# Patient Record
Sex: MALE | Race: WHITE
[De-identification: names, ages, dates, MRNs, and addresses within clinical notes are randomized per-mention and may not be internally consistent; named-entity substitution may affect disease eponyms.]

---

## 2017-03-18 ENCOUNTER — HOSPITAL ENCOUNTER (OUTPATIENT)
Dept: HOSPITAL 45 - C.LAB | Age: 27
Discharge: HOME | End: 2017-03-18
Attending: PSYCHIATRY & NEUROLOGY
Payer: COMMERCIAL

## 2017-03-18 DIAGNOSIS — F90.0: Primary | ICD-10-CM

## 2017-03-18 DIAGNOSIS — R19.7: ICD-10-CM

## 2017-03-18 DIAGNOSIS — F42.9: ICD-10-CM

## 2017-03-18 LAB
ALBUMIN/GLOB SERPL: 1.2 {RATIO} (ref 0.9–2)
ALP SERPL-CCNC: 72 U/L (ref 45–117)
ALT SERPL-CCNC: 30 U/L (ref 12–78)
ANION GAP SERPL CALC-SCNC: 8 MMOL/L (ref 3–11)
AST SERPL-CCNC: 17 U/L (ref 15–37)
BASOPHILS # BLD: 0.09 K/UL (ref 0–0.2)
BASOPHILS NFR BLD: 1.2 %
BUN SERPL-MCNC: 12 MG/DL (ref 7–18)
BUN/CREAT SERPL: 14.4 (ref 10–20)
CALCIUM SERPL-MCNC: 9.5 MG/DL (ref 8.5–10.1)
CHLORIDE SERPL-SCNC: 104 MMOL/L (ref 98–107)
CO2 SERPL-SCNC: 29 MMOL/L (ref 21–32)
COMPLETE: YES
CREAT SERPL-MCNC: 0.84 MG/DL (ref 0.6–1.4)
EOSINOPHIL NFR BLD AUTO: 237 K/UL (ref 130–400)
GLOBULIN SER-MCNC: 3.2 GM/DL (ref 2.5–4)
GLUCOSE SERPL-MCNC: 102 MG/DL (ref 70–99)
HCT VFR BLD CALC: 42 % (ref 42–52)
IG%: 5.9 %
IMM GRANULOCYTES NFR BLD AUTO: 32.6 %
LYMPHOCYTES # BLD: 2.36 K/UL (ref 1.2–3.4)
MCH RBC QN AUTO: 30.9 PG (ref 25–34)
MCHC RBC AUTO-ENTMCNC: 34.3 G/DL (ref 32–36)
MCV RBC AUTO: 90.1 FL (ref 80–100)
MONOCYTES NFR BLD: 10.8 %
NEUTROPHILS # BLD AUTO: 2.5 %
NEUTROPHILS NFR BLD AUTO: 47 %
PMV BLD AUTO: 10 FL (ref 7.4–10.4)
POTASSIUM SERPL-SCNC: 4.2 MMOL/L (ref 3.5–5.1)
RBC # BLD AUTO: 4.66 M/UL (ref 4.7–6.1)
SODIUM SERPL-SCNC: 141 MMOL/L (ref 136–145)
WBC # BLD AUTO: 7.24 K/UL (ref 4.8–10.8)

## 2017-08-04 ENCOUNTER — HOSPITAL ENCOUNTER (OUTPATIENT)
Dept: HOSPITAL 45 - C.NEUR | Age: 27
Discharge: HOME | End: 2017-08-04
Attending: PHYSICIAN ASSISTANT
Payer: COMMERCIAL

## 2017-08-04 DIAGNOSIS — G47.33: Primary | ICD-10-CM

## 2017-08-07 NOTE — PAP/PSG TECHNICIAN REPORT
[facility]

Technician Polysomnogram Report



Study name:

None

Report date:

8/5/2017



Study date:

8/4/2017

Referring Physician:

 Randall Harris M.D.



Name:

CARRIE GUY NEENA

Interpreting Physician:

Randall Harris M.D.



YOB: 1990

Technician:

Ree Mathew PSGT.



Sex:

Male







Age:

27

StudyType:

PSG



Weight:

295 lbs









Height:

27 years, Height 5' 5"







BMI:

49.09







Medications:

SEE LIST OF 15 MEDICATIONS















Patient History





27 YR. OLD MALE IN ROOM 7, PRESENTS TO THE SLEEP LAB FROM A GROUP HOME HE WILL HAVE A CARE TAKER IN 
HIS ROOM TONIGHT.HE STATES THAT HE IS STILL TIRED AT A PRESSURE OF 14 CM H20. HE NAPS TWO HOURS IN 
THE EVENING. HE ALSO STATED THAT HE WAKES WITH HIS MASK DISCONNECTED DURING THE NIGHT.







Parameters Monitored

NPSG: E1-M2, E2-M1, Fp1-M2, Fp2-M1, F3-M2, F4-M2, F4-M1, C3-M2, C4-M2, C4-M1, O1-M2, O2-M2,

O2-M1, T3-M2, T4-M1, P3-M2, P4-M1, CHIN1, CHIN2, HR, EKG, Legs, PFLOW, SNOR, FLOW, CFLOW,

Tidal Volume, THOR, ABDO, SpO2, PLTH, CPRESS, ETCO2 Wave, ETCO2, pH





Sleep Architecture



Sleep Stages



Time at Lights Off

10:45:01 PM



STAGES

Time (min.)

TST (%)



Time at Lights On

5:48:31 AM



Wake

26.5

--



Total Recording Time (TRT)

425.00 min.



N1

16.0

4



Total Sleep Period (TSP)

413.5 min.



N2

239.0

60



Total Sleep Time (TST)

397.0min.



N3

56.5

14



Awake Time

26.5 min.



REM

85.5

22



Wake after Sleep Onset

16.5 min.











Sleep Efficiency (SE)

94 %











Sleep Onset Latency (SOL)

10.0 min.











Number of Stage 1 Shifts

None











Awakenings

4











Stage Changes

26











Number of REM periods

3



REM

85.5

22



REM Latency

127.0 min.



NREM

311.5

78





Body Position Analysis





Supine

Right

Left

Side

Prone

Vertical



Total Sleep Time (min.)

63.0

6.5

327.7

334.20

0.0

0.0



Total Sleep Time (%)

16%

2%

83%

84

0%

N/A%



Total Sleep Time REM (min.)

43.5

0.0

42.0

None

0.0

0.0



Total Sleep Time NREM (min.)

19.3

6.5

285.7

None

0.0

0.0



Intermittent Wake (min.)

0.2

9.9

16.4

None

0.0

0.0



Total Sleep Period (%)

15%

None





Arousals







Myoclonus (PLM) *







Events

Count

Index



Events

Count

Index



Spontaneous

35

5



Events Awake (PLMW)

0

0.0



Respiratory

10

1.5



Events Asleep w/ Arousal (PLMA)

4

0.6



PLM

4

1



Events Asleep w/o Arousal (PLMS)

90

13.6



Snoring

1

0



Total Asleep

94

14.2



Total

50

8



Total

94

13







Respiratory Analysis *





CA

OA

MA

CH

H

RERA

Total



Count

14

1

2

0

83

25

100



Index

2.1

0.2

0.3

0

12.5

4

18.7



Mean Duration

12.0

19.3

10.8

0.00

13.7

11.5

13.1



Longest Duration

15.9

19.3

12.7

0.00

12.7

25.3

27.5







Respiratory Event Summary 







Total

Supine

~Supine

Right

Left

Prone

REM

NREM



Apneas

Count

17

1

16

0

16

N/A

0

17





Index

2.6

1

3

0.0

2.9

N/A

0

3



Hypopneas (4% Desat)

Count

83

14

69

0

69

N/A

4

79





Index

12.5

13.4

12

0.0

12.6

N/A

2.8

15.2



Apneas & All Hypopneas 

Count

100

15

85

0

85

N/A

4

96





Index

15.1

14

15

0

16

N/A

2.8

18.5



Respiratory Events 

(Apn+All Hyp+RERA)

Count

100

15

109

0

109

N/A

4

96





Index

18.7

14

20

0.0

20.0

N/A

2.8

23.1



Respiratory Related Arousal

Count

10

15

8

0

8

N/A

0

10





Index

1.5

2

1

0

1

N/A

0

2





Snoring Analysis





Supine

Right

Left

Prone

REM

NREM

Total



Snore duration

10.7 min



Snores count

57

10

423

N/A

42

448

490



Snore mean duration

1.3 Sec



Snores index

54

92

77

N/A

29.5

86.3

74.1



TST with snoring (%)

2.7%







Desaturation Event Summary:



Minimum %SpO2

Event Count

Mean/Min/Max Duration(sec.)

Desaturation Index

% Time In Bed



> 90

120

14.8 / 6.0 / 53.0

17.1

99.7



86 - 90

0

N/A

0.0

0.3



81 - 85

0

N/A

0.0

0.0



76 - 80

0

N/A

0.0

0.0



71 - 75

0

N/A

0.0

0.0



66 - 70

0

N/A

0.0

0.0



61 - 65

0

N/A

0.0

0.0



56 - 60

0

N/A

0.0

0.0



51 - 55

0

N/A

0.0

0.0



< 50

0

N/A

0.0

0.0









Total

REM

NREM

Awake



<50%

0.0 min.



51 - 60%

0.0 min.



61 - 70%

0.0 min.



71 - 80%

0.0 min.



81 - 90%

1.2 min.

0.3 min.

0.7 min.

0.2 min.



91 - 100%

422.2 min.

85.2 min.

310.7 min.

26.3 min.



Average

95



Minimum SpO2

90



Desaturation Event Index

17.0

16.8

18.7

0.0



# Desat. Events below 89%

N/A



Time(%) with Saturation below 89%

0.0



Time(min.) with Saturation below 89%

0.0







Heart Rate Analysis



End Tidal CO2 Analysis





Min (bpm)

Max (bpm)

Average (bpm)





TSP (mins)

% of TSP



Awake

63

100

77



Above 55 mmHg

0.0

0.0



NREM

54

92

68



50-55 mmHg

0.0

0.0



REM

52

92

65



45-50 mmHg

397.0

100.0



Overall

52

92

67



40-45 mmHg

0.0

0.0













35-40 mmHg

0.0

0.0













30-35 mmHg

0.0

0.0































Average ETCO2

0.0





Supplemental O2 Values



Minimum O2 level: None



Value  

Start Time

End Time





Technician Comments





PAP Study:

MR. Emery slept in the right, left, and supine positions.  No cardiac arrhythmia or PLM's noted. 
 No bruxism noted.  CPAP was initiated at +4 CMH2O and up-titrated to an optimal level of +14 CMH2O, 
Bi-Level Study started after patient started to have mixed and centrals.

 slept in the right, left, and supine positions.  No cardiac arrhythmia or PLM's noted.  
No bruxism noted.  PAP initiated at an IPAP of +4 CMH2O and an EPAP of +8 CMH2O up-titrated to an 
optimal level of:  IPAP +5 CMH2O, EPAP + 11 CMH20 with a rate of 7 BPM, which nearly eliminated all 
respiratory events and snoring.   A F[employer]P Simplus, was used during titration.   stated,  I 
did  sleep as well as I do when I am in my own bed.  

The final report will be interpreted and signed by a sleep physician. The completed physician report 
will then be placed in the patient medical record.









 



 



 



 



 



 

 



 



 



Therapy Event:



Therapy (cm H20)

4

6

8

10

12

13



Total Time at Pressure (min.)

40.7

11.0

7.5

13.9

81.6

142.5



TST at Pressure (min.)

15.7

9.5

7.5

13.9

81.6

142.5



# Periods

1



Sleep Onset (min.)

10.0

0.0



REM Onset (min.)

N/A

63.9

82.3



Sleep Efficiency %

38

86

100



Wakefulness (%)

61.5

13.6

0.0



Wakefulness (min.)

25.0

1.5

0.0



NREM 1 (%)

36.1

12.0

0.0



NREM 1 (min.)

14.7

1.3

0.0



NREM 2 (%)

2.5

74.4

100.0

100.0

27.1

75.8



NREM 2 (min.)

1.0

8.2

7.5

13.9

22.1

108.0



NREM 3 (%)

0.0

55.1

4.9



NREM 3 (min.)

0.0

45.0

7.0



REM (%)

0.0

17.8

19.3



REM (min.)

0.0

14.5

27.5



# Arousals

9

3

2

0

10

7



Arousal Index

34.5

18.9

16.0

0.0

7.4

2.9



# Snore

22

19

29

220

41

19



Snore Index

84.2

119.4

232.1

950.1

30.1

8.0



AHI

11.5

81.7

96.0

4.3

2.2

6.7



AHI Supine

N/A



AHI Non-Supine

11.5

81.7

96.0

4.3

2.2

6.7



NREM AHI

11.5

81.7

96.0

4.3

0.9

7.3



REM AHI

N/A

8.3

4.4



RDI

26.8

81.7

96.0

17.3

3.7

11.8



# Obstructive

0



# Central Ap

0

3

2

1

0

3



# Mixed

0

1

1

0



# Hypopneas

3

9

9

0

3

13



RERAS

5

0

0

3

2

12



Total Respiratory Events

7

13

12

4

5

28



Time Below SpO2 89.00% (min.)

0.0



Mean NREM SpO2 (%)

94

95

94

93

94

95



Mean REM SpO2 (%)

N/A

95

95



Mean Sleep SpO2 (%)

94

95

94

93

95

95



Min NREM SpO2 (%)

91

90

91

90

91

92



Min REM SpO2 (%)

N/A

91

92



Position Supine (min.)

0.0



Position Non-supine (min.)

15.7

9.5

7.5

13.9

81.6

142.5



LM Index Sleep

61.3

6.3

32.0

0.0

12.5

7.2



LM Index NREM

61.3

6.3

32.0

0.0

5.4

5.7



LM Index REM

N/A

45.5

13.1



Mean Heart Rate (bpm)

75

70

70

72

71

65



Min Heart Rate (bpm)

60

57

59

54







Therapy (cm H20)

14

8/4

9/4

10/4

11/5



Total Time at Pressure (min.)

10.7

15.4

23.5

12.8

63.8



TST at Pressure (min.)

10.7

15.4

23.5

12.8

63.8



# Periods

1



Sleep Onset (min.)

0.0



REM Onset (min.)

N/A

14.8



Sleep Efficiency %

100



Wakefulness (%)

0.0



Wakefulness (min.)

0.0



NREM 1 (%)

0.0



NREM 1 (min.)

0.0



NREM 2 (%)

58.1

100.0

31.8



NREM 2 (min.)

6.2

15.4

23.5

12.8

20.3



NREM 3 (%)

41.9

0.0



NREM 3 (min.)

4.5

0.0



REM (%)

0.0

68.2



REM (min.)

0.0

43.5



# Arousals

0

1

4

4

10



Arousal Index

0.0

3.9

10.2

18.7

9.4



# Snore

2

33

22

25

58



Snore Index

11.2

128.7

56.2

117.1

54.5



AHI

44.7

15.6

30.7

60.9

14.1



AHI Supine

N/A

14.3



AHI Non-Supine

44.7

15.6

30.7

60.9

0.0



NREM AHI

44.7

15.6

30.7

60.9

44.3



REM AHI

N/A

0.0



RDI

44.7

27.3

30.7

60.9

14.1



# Obstructive

0

1

0



# Central Ap

3

0

0

1

1



# Mixed

0



# Hypopneas

5

4

12

11

14



RERAS

0

3

0



Total Respiratory Events

8

7

12

13

15



Time Below SpO2 89.00% (min.)

0.0



Mean NREM SpO2 (%)

95

94



Mean REM SpO2 (%)

N/A

94



Mean Sleep SpO2 (%)

95

94



Min NREM SpO2 (%)

92

92

91

90

90



Min REM SpO2 (%)

N/A

90



Position Supine (min.)

0.0

62.8



Position Non-supine (min.)

10.7

15.4

23.5

12.8

1.0



LM Index Sleep

5.6

23.4

20.4

23.4

17.9



LM Index NREM

5.6

23.4

20.4

23.4

26.6



LM Index REM

N/A

13.8



Mean Heart Rate (bpm)

64

66

66

65

65



Min Heart Rate (bpm)

56

56

55

54

52

## 2017-08-14 NOTE — POLYSOMNOGRAPH REPORT
CLINICAL DATA:  A 27-year-old male with BMI of 49 referred by Lorenzo Perdomo

for a CPAP titration study.  He has severe sleep apnea and was followed by

Dr. Coley.  He is on CPAP of 14 cm of water pressure, but still has fatigue

and naps in the evening.  He awakens with his mask disconnected

during the night.  He does live in a group home and the caretaker was in the

room during his study.

 

SLEEP ARCHITECTURE:  Total recording time was 425 minutes.  Total sleep

period was 413.5 minutes.  Total sleep time was 397 minutes divided between

311.5 minutes of non-REM sleep and 85.5 minutes of REM sleep.  Sleep onset

latency was 10 minutes.  REM latency was 127 minutes.  Sleep efficiency was

94%.  Wake after sleep onset was 16.5 minutes.  Sleep consisted of stage N1

at 4%, stage N2 at 68%, stage N3 at 14%, and REM 22%.

 

AROUSAL DATA:  50 arousals were recorded for an index of 8 per hour.

 

PERIODIC LIMB MOVEMENTS DATA:  94 limb movements during sleep were noted for

an index of 14.2 per hour.

 

RESPIRATORY DATA:  AHI was 15.1.  There were 14 central, 1 obstructive, and 2

mixed apneic episodes.  The longest apneic episode was 19.3 seconds.  There

were 82 hypopneic episodes.  The mean duration of hypopnea was 13.7 seconds.

 

OXIMETRY DATA:  No hypoxemia was seen.  Oxygen miri was 90%.  Mean

saturation was 95%.

 

EKG:  Heart rates ranged from 52 to 92 beats per minute.  No arrhythmias were

noted.

 

TECHNICIAN'S COMMENTS AND TREATMENT SUMMARY:  The patient slept in the right,

left, and supine positions.  He used a Perpet[employer] Simplus mask.  He was

initially placed on CPAP and was titrated up to 14 cm of water pressure, but

began to have central apneic episodes consistent with complex sleep apnea. 

At that point, he was switched to BIPAP and was titrated to a final pressure

setting of BiPAP 11/5 with a backup rate of 7 breaths per minute.  At that

level, he had an AHI of 14.1.

 

IMPRESSION:  Severe sleep apnea/hypopnea improved on BiPAP 11/5 with a backup

rate of 7 breaths per minute.

 

RECOMMENDATIONS:  The patient should be converted to BIPAP at the above noted

pressure settings and should be seen back in followup within 90 days to

document efficacy and compliance.

 

 

[facility]

## 2018-04-09 ENCOUNTER — HOSPITAL ENCOUNTER (OUTPATIENT)
Dept: HOSPITAL 45 - C.RAD | Age: 28
Discharge: HOME | End: 2018-04-09
Attending: INTERNAL MEDICINE
Payer: COMMERCIAL

## 2018-04-09 DIAGNOSIS — M79.672: Primary | ICD-10-CM

## 2018-04-09 DIAGNOSIS — M79.89: ICD-10-CM

## 2018-04-09 NOTE — DIAGNOSTIC IMAGING REPORT
LEFT FOOT 3 VIEWS



CLINICAL HISTORY: Left foot pain.



FINDINGS: 3 views of the left foot are obtained. No prior studies are available

for comparison at the time of dictation. The skeletal structures are well

mineralized. No fracture is seen. The joint spaces of the foot are

well-maintained. Soft tissue edema is present along the dorsal surface of the

foot.



IMPRESSION: Soft tissue swelling with no radiographic evidence of left foot

fracture.







Electronically signed by:  Teja Waite M.D.

4/9/2018 6:58 PM



Dictated Date/Time:  4/9/2018 6:57 PM